# Patient Record
Sex: MALE | Race: BLACK OR AFRICAN AMERICAN | NOT HISPANIC OR LATINO | Employment: UNEMPLOYED | URBAN - METROPOLITAN AREA
[De-identification: names, ages, dates, MRNs, and addresses within clinical notes are randomized per-mention and may not be internally consistent; named-entity substitution may affect disease eponyms.]

---

## 2020-10-04 ENCOUNTER — HOSPITAL ENCOUNTER (EMERGENCY)
Facility: MEDICAL CENTER | Age: 40
End: 2020-10-05
Attending: EMERGENCY MEDICINE

## 2020-10-04 ENCOUNTER — HOSPITAL ENCOUNTER (EMERGENCY)
Dept: HOSPITAL 8 - ED | Age: 40
Discharge: HOME | End: 2020-10-04
Payer: SELF-PAY

## 2020-10-04 VITALS — WEIGHT: 191.8 LBS | BODY MASS INDEX: 27.46 KG/M2 | HEIGHT: 70 IN

## 2020-10-04 VITALS — DIASTOLIC BLOOD PRESSURE: 61 MMHG | SYSTOLIC BLOOD PRESSURE: 113 MMHG

## 2020-10-04 DIAGNOSIS — F12.129: ICD-10-CM
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
## 2020-10-04 DIAGNOSIS — S43.004A: Primary | ICD-10-CM
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
## 2020-10-04 DIAGNOSIS — F10.129: ICD-10-CM

## 2020-10-04 DIAGNOSIS — M25.511 ACUTE PAIN OF RIGHT SHOULDER: ICD-10-CM

## 2020-10-04 PROCEDURE — 73030 X-RAY EXAM OF SHOULDER: CPT

## 2020-10-04 PROCEDURE — 99284 EMERGENCY DEPT VISIT MOD MDM: CPT

## 2020-10-04 PROCEDURE — 96374 THER/PROPH/DIAG INJ IV PUSH: CPT

## 2020-10-04 PROCEDURE — 96375 TX/PRO/DX INJ NEW DRUG ADDON: CPT

## 2020-10-04 RX ADMIN — MORPHINE SULFATE PRN MG: 4 INJECTION INTRAVENOUS at 13:14

## 2020-10-04 RX ADMIN — MORPHINE SULFATE PRN MG: 4 INJECTION INTRAVENOUS at 13:00
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
## 2020-10-05 VITALS
TEMPERATURE: 98.2 F | SYSTOLIC BLOOD PRESSURE: 126 MMHG | OXYGEN SATURATION: 98 % | WEIGHT: 160 LBS | HEART RATE: 88 BPM | BODY MASS INDEX: 22.4 KG/M2 | DIASTOLIC BLOOD PRESSURE: 78 MMHG | RESPIRATION RATE: 17 BRPM | HEIGHT: 71 IN

## 2020-10-05 NOTE — DISCHARGE PLANNING
Medical Social Work    MSW received a call from bedside RN that pt is in need of resources as he's from out of the area.  Pt may have money for a hotel.  MSW provided bedside RN with a list of local resources including a motel list for pt.

## 2020-10-05 NOTE — ED NOTES
Patient ambulatory with steady gait.  Patient verbalizes understanding of follow up and resources provided.  Warm clothes provided.

## 2020-10-05 NOTE — ED NOTES
Patient resting comfortably.  Soda provided.  Patient updated on plan of care.  Will discharge patient at 0200, patient verbalizes understanding.  All questions answered.  Will continue to monitor
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
## 2020-10-07 ENCOUNTER — HOSPITAL ENCOUNTER (EMERGENCY)
Dept: HOSPITAL 8 - ED | Age: 40
Discharge: HOME | End: 2020-10-07
Payer: SELF-PAY

## 2020-10-07 VITALS — BODY MASS INDEX: 28.41 KG/M2 | HEIGHT: 69 IN | WEIGHT: 191.8 LBS

## 2020-10-07 VITALS — DIASTOLIC BLOOD PRESSURE: 69 MMHG | SYSTOLIC BLOOD PRESSURE: 105 MMHG
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
## 2020-10-07 DIAGNOSIS — G89.11: Primary | ICD-10-CM
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
## 2020-10-07 DIAGNOSIS — M25.511: ICD-10-CM

## 2020-10-07 PROCEDURE — 96372 THER/PROPH/DIAG INJ SC/IM: CPT

## 2020-10-07 PROCEDURE — 99283 EMERGENCY DEPT VISIT LOW MDM: CPT

## 2020-10-08 ENCOUNTER — HOSPITAL ENCOUNTER (EMERGENCY)
Facility: MEDICAL CENTER | Age: 40
End: 2020-10-08
Attending: EMERGENCY MEDICINE | Admitting: EMERGENCY MEDICINE

## 2020-10-08 ENCOUNTER — APPOINTMENT (OUTPATIENT)
Dept: RADIOLOGY | Facility: MEDICAL CENTER | Age: 40
End: 2020-10-08
Attending: EMERGENCY MEDICINE

## 2020-10-08 VITALS
HEIGHT: 71 IN | OXYGEN SATURATION: 95 % | DIASTOLIC BLOOD PRESSURE: 85 MMHG | TEMPERATURE: 97.9 F | SYSTOLIC BLOOD PRESSURE: 130 MMHG | RESPIRATION RATE: 16 BRPM | BODY MASS INDEX: 20.31 KG/M2 | HEART RATE: 99 BPM | WEIGHT: 145.06 LBS

## 2020-10-08 DIAGNOSIS — M54.9 PAIN, UPPER BACK: ICD-10-CM

## 2020-10-08 DIAGNOSIS — Z59.00 HOMELESSNESS: ICD-10-CM

## 2020-10-08 LAB
AMPHET UR QL SCN: NEGATIVE
BARBITURATES UR QL SCN: NEGATIVE
BENZODIAZ UR QL SCN: NEGATIVE
BZE UR QL SCN: NEGATIVE
CANNABINOIDS UR QL SCN: POSITIVE
METHADONE UR QL SCN: NEGATIVE
OPIATES UR QL SCN: NEGATIVE
OXYCODONE UR QL SCN: NEGATIVE
PCP UR QL SCN: NEGATIVE
PROPOXYPH UR QL SCN: NEGATIVE

## 2020-10-08 PROCEDURE — 72070 X-RAY EXAM THORAC SPINE 2VWS: CPT

## 2020-10-08 PROCEDURE — 80307 DRUG TEST PRSMV CHEM ANLYZR: CPT

## 2020-10-08 PROCEDURE — 99283 EMERGENCY DEPT VISIT LOW MDM: CPT

## 2020-10-08 SDOH — HEALTH STABILITY: MENTAL HEALTH: HOW OFTEN DO YOU HAVE A DRINK CONTAINING ALCOHOL?: NEVER

## 2020-10-08 SDOH — ECONOMIC STABILITY - HOUSING INSECURITY: HOMELESSNESS UNSPECIFIED: Z59.00

## 2020-10-08 ASSESSMENT — LIFESTYLE VARIABLES: DO YOU DRINK ALCOHOL: NO

## 2020-10-08 NOTE — ED NOTES
Pt asking to call aunt and also talks with case management. Security called as standby since pt has pressured speech and becomes upset. After talking with aunt pt is calmer and returns to room. Case management working on getting pt a bus ticket
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
## 2020-10-08 NOTE — DISCHARGE PLANNING
Valentino from [facility] [employer] confirmed that pt's bus ticket to [facility] departs at 409 pm.

## 2020-10-08 NOTE — ED PROVIDER NOTES
ED Provider Note    Scribed for Mari Issa M.D. by Sandra Estrada. 10/8/2020  7:57 AM    Primary care provider: None noted  Means of arrival: Walk-in  History obtained from: Patient  History limited by: None  CHIEF COMPLAINT  Chief Complaint   Patient presents with   • Back Pain     pt c/o of upper back pain and recently had his right shoulder reducted after a dislocation   • Medication Refill     pt states he is off his medications and is in need of prescriptions. hx of schizoprenia, anxiety and depression. Pt was prescribed Geodon, xanax and unable to provide depression medication. Denies SI or HI    • Foot Pain     c/o of fungus, cracked skin with pain on his left calcaneus       HPI  Valentin Ugalde is a 39 y.o. male who presents with right upper back pain onset a few days ago after he dislocated his right shoulder. The patient was seen at [facility] at that time, and his shoulder was reduced. He states he was seen at another hospital yesterday, but is unsure where and that he may have left his shoulder immobilizer there.  Patient was seen here couple days ago complaining of right shoulder pain as well.  X-ray at that time revealed no evidence of dislocation or fracture.  He denies any new trauma injuries. He adds that he is off his psychiatric medications, and would like a medication refill. However, the patient does not know what medications he takes. He notes that he sees a psychiatrist in [place], [place], but then says he is from [place] and [place].  He also complains of a crack to his left heel.  He says that is been present since he got out of care home in [place].  The patient wants to see a , a COVID test, IV fluids, wants me to look at a crack on his heel and wants a sandwich. He adds that he is looking for his brother who is a physician. He denies any suicidal or homicidal ideation.  Social workers saw patient when he was here a few days ago and provided him with a motel room.  He is  "currently staying at the [facility] [address] on [address].    REVIEW OF SYSTEMS  Pertinent positives include back pain and medication refill. Pertinent negatives include no suicidal or homicidal ideation.  All other systems reviewed and negative.   See HPI for further details. All other systems are negative.    PAST MEDICAL HISTORY  Past Medical History:   Diagnosis Date   • Psychiatric disorder        FAMILY HISTORY  History reviewed. No pertinent family history.    SOCIAL HISTORY  • Smoking status: Current Some Day Smoker   Substance and Sexual Activity   • Alcohol use: Not Currently     Frequency: Never   • Drug use: Yes     Comment: [facility]   Social History Narrative       SURGICAL HISTORY  History reviewed. No pertinent surgical history.    CURRENT MEDICATIONS  Home Medications    **Home medications have not yet been reviewed for this encounter**         ALLERGIES  No Known Allergies    PHYSICAL EXAM  VITAL SIGNS: /102   Pulse (!) 107   Temp 36.6 °C (97.9 °F) (Temporal)   Resp 20   Ht 1.803 m (5' 11\")   Wt 65.8 kg (145 lb 1 oz)   SpO2 98%   BMI 20.23 kg/m²      Constitutional: Well developed, well nourished; No acute distress; Non-toxic appearance. Fidgety, restless, flight of ideas.  Tangential.  HENT: Normocephalic, atraumatic; Bilateral external ears normal;   Eyes: PERRL, EOMI, Conjunctiva normal. No discharge.   Neck:  Supple  Cardiovascular: Regular rate and rhythm without murmurs, rubs, or gallop.   Thorax & Lungs: No respiratory distress, breath sounds clear to auscultation bilaterally without wheezing, rales or rhonchi. Nontender chest wall. No crepitus or subcutaneous air  Skin: Good color; warm and dry without rash or petechia.  Back: Nontender midline T-spine and L-spine.  No step-off deformity.  No tenderness to the scapula, the rhomboids, the trapezius, paraspinous muscles.  No rash.  No ecchymosis.  No swelling.  No induration.  No erythema.  No abrasions., No CVA tenderness.   Extremities: Distal " pedal pulses equal bilaterally.  There is a crack on the left heel.  The crack is not infected.  Feet are very dry and callused.  Musculoskeletal: Good range of motion in all major joints. No tenderness to palpation or major deformities noted.   Neurologic: Alert & oriented x 4, clear speech, normal gait.    LABS/RADIOLOGY/PROCEDURES  Results for orders placed or performed during the hospital encounter of 10/08/20   URINE DRUG SCREEN   Result Value Ref Range    Amphetamines Urine Negative Negative    Barbiturates Negative Negative    Benzodiazepines Negative Negative    Cocaine Metabolite Negative Negative    Methadone Negative Negative    Opiates Negative Negative    Oxycodone Negative Negative    Phencyclidine -Pcp Negative Negative    Propoxyphene Negative Negative    Cannabinoid Metab Positive (A) Negative       DX-THORACIC SPINE-2 VIEWS   Final Result      1.  Unremarkable thoracic spine.          COURSE & MEDICAL DECISION MAKING  Pertinent Labs & Imaging studies reviewed. (See chart for details)    Reviewed patient's old medical records which showed the patient was seen here 10/05/20.     7:57 AM - Patient seen and examined at bedside. Patient is sitting up in bed, fidgety on exam. Discussed plan of care, including imaging to rule out any injury. Patient agrees to the plan of care. Ordered for Imaging and labs to evaluate his symptoms.     8:45 AM -  Per social work, they are attempting to find a place for the patient to go. Social work was able to contact his aunt in [place], who states she does not know where the patient has been for the past week. The patient's aunt gave social work the patient's sister number and address, and they will work on getting him a bus ticket to see her. When the patient was here 2 days ago he got a room at [facility]. The patient began to escalate, and was claiming we were not helping him even though social workers are trying to help him find his way back to his family. Nursing staff  questioned the patient on SI and he denies.     10:55 AM - Patient states he doesn't need me anymore and doesnt want to talk to me. He is waiting for his bus ticket. I informed him of the plan for discharge home.     Patient presents to the ER complaining of right upper back pain which began shortly after dislocating his right shoulder.  He states that 4 days ago he dislocated his right shoulder.  He said he was put back in at [place].  Patient came to [facility] subsequent to his relocation at [facility].  He complained of right shoulder pain.  X-ray was done.  X-ray was negative.  He was seen by  at that time.  Since he was homeless they gave him a hotel voucher and he is currently at the [place] [address] [facility].  He says he was at another hospital yesterday and he thinks he left a shoulder immobilizer at that hospital.  Patient is moving his right shoulder around without any difficulty or limitation of movement.  He has no tenderness in the upper spine or in the area that he describes discomfort.  No trouble breathing.  No chest pain.  No increased pain with deep breathing.  No cough.  X-ray of the thoracic spine is negative.  Patient is very fidgety and restless.  He is very tangential.  He has pressured speech.  He has history of psychiatric disorder.  He says he needs a refill of his psychiatric medications but he cannot tell me the name of his medications.  He tells me to call his psychiatrist in [place] but he cannot really tell me the name.  Patient says that he is from [place], but then he turns around and says he is from [place].  He also says he was in care home in [place].  The social workers were able to get a hold of the patient's aunt.  He had her phone number.  The aunt lives in [place].  The aunt says that they have been trying to figure out where the patient has been over the last week.  Apparently he moves around quite a lot.  The patient talked to the aunt.  The aunt was get  "a try to have him up with the patient's sister who lives in [place].  Social workers were able to get the patient a bus pass to [place].  When I went back into tell the patient his x-ray was normal, he said \"I do not need you anymore.\"  I want talk to anymore.\"  At this time I think he is safe and stable for outpatient management discharge home.  He has no signs of infection.  No concern for pneumonia.  No concern for cardiac.  He says the pain started after dislocating his shoulder.  However, he is moving his shoulder around without any problem.  He evidently left his shoulder immobilizer at 1 of the other hospitals yesterday.  He does not want a new one.  Patient has been given strict return precautions and discharge instructions and he understands treatment plan and follow-up.    PPE Note: I personally donned full PPE for all patient encounters during this visit, including an N95 respirator mask, gloves, gown, and goggles.     Scribe remained outside the patient's room and did not have any contact with the patient for the duration of patient encounter.     The patient will return for new or worsening symptoms and is stable at the time of discharge.    The patient is referred to a primary physician for blood pressure management, diabetic screening, and for all other preventative health concerns.    DISPOSITION:  Patient will be discharged home in stable condition.    FOLLOW UP:  [address] [address]  [phone]        FINAL IMPRESSION  1. Pain, upper back Acute   2. Homelessness Acute        This dictation has been created using voice recognition software. The accuracy of the dictation is limited by the abilities of the software. I expect there may be some errors of grammar and possibly content. I made every attempt to manually correct the errors within my dictation. However, errors related to voice recognition software may still exist and should be interpreted within the " appropriate context.     Sandra BAUER (Scribe), am scribing for, and in the presence of, Mari Issa M.D..    Electronically signed by: Sandra Estrada (Michaela), 10/8/2020    Mari BAUER M.D. personally performed the services described in this documentation, as scribed by Sandra Estrada in my presence, and it is both accurate and complete. C.    The note accurately reflects work and decisions made by me.  Mari Issa M.D.  10/8/2020  4:54 PM

## 2020-10-08 NOTE — DISCHARGE PLANNING
Discussed with Valentino the possibility of [employer] but there is no train that will take him to [place] and the trains tonight is sold out.     Valentino assurred CM that they will make sure that pt gets to the bus this afternoon.

## 2020-10-08 NOTE — ED NOTES
Pt walked to taxi with case management. Pt alert and ambulates independently. Respirations even and unlabored

## 2020-10-08 NOTE — DISCHARGE INSTRUCTIONS
Follow-up at the [facility] clinic within the next 1 to 2 days.  Please call for appointment.    Return to the ER for worsening back pain, changing back pain, fevers over 100.4, shaking chills, nausea, vomiting, chest pain, shortness of breath, worsening shoulder pain, or for any concerns.    Use ice to help with the pain.    Take over-the-counter Tylenol and ibuprofen for your discomfort.  You can also use over-the-counter [place] patches for your pain.

## 2020-10-09 ENCOUNTER — HOSPITAL ENCOUNTER (EMERGENCY)
Facility: MEDICAL CENTER | Age: 40
End: 2020-10-09
Attending: EMERGENCY MEDICINE

## 2020-10-09 ENCOUNTER — HOSPITAL ENCOUNTER (EMERGENCY)
Dept: HOSPITAL 8 - ED | Age: 40
Discharge: LEFT BEFORE DISCHARGE | End: 2020-10-09
Payer: SELF-PAY

## 2020-10-09 VITALS
HEIGHT: 70 IN | RESPIRATION RATE: 18 BRPM | OXYGEN SATURATION: 96 % | HEART RATE: 98 BPM | BODY MASS INDEX: 20.96 KG/M2 | WEIGHT: 146.39 LBS | TEMPERATURE: 98.8 F | SYSTOLIC BLOOD PRESSURE: 142 MMHG | DIASTOLIC BLOOD PRESSURE: 103 MMHG

## 2020-10-09 VITALS — DIASTOLIC BLOOD PRESSURE: 90 MMHG | SYSTOLIC BLOOD PRESSURE: 142 MMHG

## 2020-10-09 VITALS — BODY MASS INDEX: 20.68 KG/M2 | WEIGHT: 147.71 LBS | HEIGHT: 71 IN

## 2020-10-09 DIAGNOSIS — M24.411: Primary | ICD-10-CM

## 2020-10-09 DIAGNOSIS — Z59.00 HOMELESSNESS: ICD-10-CM

## 2020-10-09 DIAGNOSIS — R45.1 AGITATION: ICD-10-CM

## 2020-10-09 DIAGNOSIS — B35.3: ICD-10-CM

## 2020-10-09 PROCEDURE — 99281 EMR DPT VST MAYX REQ PHY/QHP: CPT

## 2020-10-09 PROCEDURE — 99282 EMERGENCY DEPT VISIT SF MDM: CPT

## 2020-10-09 SDOH — ECONOMIC STABILITY - HOUSING INSECURITY: HOMELESSNESS UNSPECIFIED: Z59.00
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
## 2020-10-09 NOTE — NUR
Patient at Rn desk using telephone. He is agitated and using obscene language. 
Preparing to call hospital security to have patient escorted out of ER. He is 
discharged.

## 2020-10-09 NOTE — DISCHARGE PLANNING
" note:  As per Valentino from [employer], they bought pt the bus ticket and it was time to board, pt decided he wasn't going because \"[employer] owes him money and he is not leaving\". Notified pt's Auntie Sybil.   "

## 2020-10-09 NOTE — ED TRIAGE NOTES
"Valentin Ugalde  Chief Complaint   Patient presents with   • Homeless   • Medication Refill   • Agitation     Pt ambulatory to triage. Pt comes into triage yelling \"you punk ass juanach ain't done nothing for me.\" Pt states he has been seen in ED on two other occasions for the same complaints. Pt c/o RIGHT arm pain and dry and cracked LEFT foot. Pt yelling \"ya'll tried to get me a ticket to [place] and I have a warrant in [place]. I'm not trying to fuck with that. I'm trying to make it to [place]. You guys just keep telling me to put my mask on, man I ain't got no disease, I haven't even slept with no women recently.\" Pt becoming more agitated during triage, stating \"you guys are just making problems, but I'm gonna make sure you guys are the one that has the problem.\"     /103   Pulse 98   Temp 37.1 °C (98.8 °F) (Temporal)   Resp 18   Ht 1.778 m (5' 10\")   Wt 66.4 kg (146 lb 6.2 oz)   SpO2 96%   BMI 21.00 kg/m²     Dr Clinton to triage for pt assessment. Security called to standby.   "